# Patient Record
Sex: MALE | Race: OTHER | NOT HISPANIC OR LATINO
[De-identification: names, ages, dates, MRNs, and addresses within clinical notes are randomized per-mention and may not be internally consistent; named-entity substitution may affect disease eponyms.]

---

## 2022-08-10 PROBLEM — Z00.00 ENCOUNTER FOR PREVENTIVE HEALTH EXAMINATION: Status: ACTIVE | Noted: 2022-08-10

## 2022-08-19 ENCOUNTER — APPOINTMENT (OUTPATIENT)
Dept: NEUROPSYCHOLOGY | Facility: CLINIC | Age: 62
End: 2022-08-19

## 2022-09-02 ENCOUNTER — APPOINTMENT (OUTPATIENT)
Dept: NEUROPSYCHOLOGY | Facility: CLINIC | Age: 62
End: 2022-09-02

## 2022-09-02 DIAGNOSIS — F45.42 PAIN DISORDER WITH RELATED PSYCHOLOGICAL FACTORS: ICD-10-CM

## 2022-09-02 PROCEDURE — 90791 PSYCH DIAGNOSTIC EVALUATION: CPT

## 2022-09-02 PROCEDURE — 99072 ADDL SUPL MATRL&STAF TM PHE: CPT

## 2022-10-19 ENCOUNTER — APPOINTMENT (OUTPATIENT)
Dept: CARDIOLOGY | Facility: CLINIC | Age: 62
End: 2022-10-19

## 2022-10-19 ENCOUNTER — TRANSCRIPTION ENCOUNTER (OUTPATIENT)
Age: 62
End: 2022-10-19

## 2022-10-19 VITALS
OXYGEN SATURATION: 96 % | BODY MASS INDEX: 39.15 KG/M2 | WEIGHT: 235 LBS | SYSTOLIC BLOOD PRESSURE: 142 MMHG | HEART RATE: 81 BPM | HEIGHT: 65 IN | DIASTOLIC BLOOD PRESSURE: 82 MMHG

## 2022-10-19 DIAGNOSIS — E78.5 HYPERLIPIDEMIA, UNSPECIFIED: ICD-10-CM

## 2022-10-19 DIAGNOSIS — I71.40 ABDOMINAL AORTIC ANEURYSM, WITHOUT RUPTURE, UNSPECIFIED: ICD-10-CM

## 2022-10-19 DIAGNOSIS — Z87.891 PERSONAL HISTORY OF NICOTINE DEPENDENCE: ICD-10-CM

## 2022-10-19 DIAGNOSIS — F17.290 NICOTINE DEPENDENCE, OTHER TOBACCO PRODUCT, UNCOMPLICATED: ICD-10-CM

## 2022-10-19 DIAGNOSIS — R06.02 SHORTNESS OF BREATH: ICD-10-CM

## 2022-10-19 DIAGNOSIS — Z82.49 FAMILY HISTORY OF ISCHEMIC HEART DISEASE AND OTHER DISEASES OF THE CIRCULATORY SYSTEM: ICD-10-CM

## 2022-10-19 PROCEDURE — 99406 BEHAV CHNG SMOKING 3-10 MIN: CPT

## 2022-10-19 PROCEDURE — 99204 OFFICE O/P NEW MOD 45 MIN: CPT

## 2022-10-19 NOTE — PHYSICAL EXAM
[Well Developed] : well developed [Well Nourished] : well nourished [No Acute Distress] : no acute distress [Normal Conjunctiva] : normal conjunctiva [Normal Venous Pressure] : normal venous pressure [No Carotid Bruit] : no carotid bruit [Normal S1, S2] : normal S1, S2 [No Rub] : no rub [No Gallop] : no gallop [II] : a grade 2 [Clear Lung Fields] : clear lung fields [Good Air Entry] : good air entry [No Respiratory Distress] : no respiratory distress  [Soft] : abdomen soft [Non Tender] : non-tender [No Masses/organomegaly] : no masses/organomegaly [Normal Bowel Sounds] : normal bowel sounds [Normal Gait] : normal gait [No Edema] : no edema [No Cyanosis] : no cyanosis [No Clubbing] : no clubbing [No Varicosities] : no varicosities [No Rash] : no rash [No Skin Lesions] : no skin lesions [Moves all extremities] : moves all extremities [No Focal Deficits] : no focal deficits [Normal Speech] : normal speech [Alert and Oriented] : alert and oriented [Normal memory] : normal memory

## 2022-10-20 NOTE — HISTORY OF PRESENT ILLNESS
[FreeTextEntry1] : VAN JOY is a 62-year-old male, with a PMHx significant for abdominal aortic ectasia (measuring 3.0 cm one year ago), HLD, and smoking, who presents today for cardiac evaluation for abdominal aortic aneurysm. Denies any other complaints. Otherwise: (-) SOB, (-) chest pain. \par \par \par

## 2022-10-20 NOTE — DISCUSSION/SUMMARY
[FreeTextEntry1] : AAA: The impression is abdominal aortic aneurysm. Recommend a repeat abdominal aortic US. \par \par Smoking: Nicotine dependence. Counselling provided to the patient regarding cessation of smoking. Approximately 5 minutes spent on smoking cessation.\par \par Elevated BP: BP today measures 142/82 mmHg. Advised to keep a BP diary x 3 days. \par \par Murmur: Recommend an echocardiogram to evaluate LV function.\par \par HLD: The impression is hyperlipidemia. Currently, the condition is stable. There are no changes in medication management. Continue current medical therapy. Other planned treatment includes diet modification, exercise, and weight loss.\par \par Instructed to follow up after testing is complete. \par \par Plan was discussed with the patient.\par

## 2022-12-23 ENCOUNTER — APPOINTMENT (OUTPATIENT)
Dept: CARDIOLOGY | Facility: CLINIC | Age: 62
End: 2022-12-23

## 2022-12-23 PROCEDURE — 93306 TTE W/DOPPLER COMPLETE: CPT

## 2022-12-23 PROCEDURE — 76705 ECHO EXAM OF ABDOMEN: CPT

## 2024-07-17 ENCOUNTER — RX RENEWAL (OUTPATIENT)
Age: 64
End: 2024-07-17

## 2024-07-29 ENCOUNTER — APPOINTMENT (OUTPATIENT)
Dept: CARDIOLOGY | Facility: CLINIC | Age: 64
End: 2024-07-29
Payer: MEDICARE

## 2024-07-29 VITALS
HEART RATE: 70 BPM | OXYGEN SATURATION: 97 % | RESPIRATION RATE: 16 BRPM | BODY MASS INDEX: 35.82 KG/M2 | DIASTOLIC BLOOD PRESSURE: 62 MMHG | HEIGHT: 65 IN | SYSTOLIC BLOOD PRESSURE: 122 MMHG | WEIGHT: 215 LBS

## 2024-07-29 DIAGNOSIS — I71.40 ABDOMINAL AORTIC ANEURYSM, WITHOUT RUPTURE, UNSPECIFIED: ICD-10-CM

## 2024-07-29 DIAGNOSIS — R06.02 SHORTNESS OF BREATH: ICD-10-CM

## 2024-07-29 DIAGNOSIS — E78.5 HYPERLIPIDEMIA, UNSPECIFIED: ICD-10-CM

## 2024-07-29 PROCEDURE — 99214 OFFICE O/P EST MOD 30 MIN: CPT

## 2024-07-29 PROCEDURE — G2211 COMPLEX E/M VISIT ADD ON: CPT

## 2024-07-29 RX ORDER — UBIDECARENONE/VIT E ACET 100MG-5
100 CAPSULE ORAL
Refills: 0 | Status: ACTIVE | COMMUNITY
Start: 2024-07-29

## 2024-07-31 NOTE — REVIEW OF SYSTEMS
[Negative] : Heme/Lymph [Chest Discomfort] : no chest discomfort [Lower Ext Edema] : no extremity edema [Leg Claudication] : no intermittent leg claudication [Palpitations] : no palpitations [Syncope] : no syncope [Cough] : no cough [FreeTextEntry5] : (+) SOB on Exertion, (+) Orthopnea, (+) PND [FreeTextEntry6] : (+) SOB on Exertion

## 2024-07-31 NOTE — CARDIOLOGY SUMMARY
[de-identified] : TTE - 12/23/2022: CONCLUSIONS: 1. Normal left ventricular size, wall thickness, wall motion, and systolic function. 2. Normal right ventricular size and systolic function. 3. The left atrium is normal in size. 4. The right atrium is normal in size. 5. No pericardial effusion seen. 6. Normal mitral valve structure and function. No mitral stenosis or significant regurgitation. 7. Tricuspid valve is structurally normal with no stenosis or significant regurgitation. =======================================================

## 2024-07-31 NOTE — DISCUSSION/SUMMARY
[FreeTextEntry1] : SOB on Exertion/Orthopnea/PND: Recommend an echocardiogram to evaluate LV function and rule out CHF.  SOBOE: Due to exertional nature of symptoms, recommend a stress echocardiogram to evaluate for exercise-induced symptoms.  AAA: The impression is abdominal aortic aneurysm. Recommend a repeat abdominal aortic US to reevaluate AAA.   HLD: The impression is hyperlipidemia. Currently, the condition is stable. There are no changes in medication management. Continue current medical therapy. Other planned treatment includes diet modification, exercise, and weight loss.  Instructed to follow up after testing is complete.  Plan was discussed with the patient.

## 2024-07-31 NOTE — CARDIOLOGY SUMMARY
[de-identified] : TTE - 12/23/2022: CONCLUSIONS: 1. Normal left ventricular size, wall thickness, wall motion, and systolic function. 2. Normal right ventricular size and systolic function. 3. The left atrium is normal in size. 4. The right atrium is normal in size. 5. No pericardial effusion seen. 6. Normal mitral valve structure and function. No mitral stenosis or significant regurgitation. 7. Tricuspid valve is structurally normal with no stenosis or significant regurgitation. =======================================================

## 2024-07-31 NOTE — HISTORY OF PRESENT ILLNESS
[FreeTextEntry1] : VAN JOY is a 64-year-old male, with a PMHx significant for borderline abdominal aortic aneurysm (2.5 cm) and HLD, who presents today for a follow-up visit. Last seen in 2022. Recently had a spinal stimulator placed and is now pending a right hip surgery. Endorses SOB with exertion, which is relieved with rest. Otherwise: (-) chest pain, (-) cough, (-) hemoptysis, (-) recent travel, (-) prolonged immobility.  Social History: (+) Former smoker.

## 2024-08-27 ENCOUNTER — APPOINTMENT (OUTPATIENT)
Dept: CARDIOLOGY | Facility: CLINIC | Age: 64
End: 2024-08-27
Payer: MEDICARE

## 2024-08-27 DIAGNOSIS — E78.5 HYPERLIPIDEMIA, UNSPECIFIED: ICD-10-CM

## 2024-08-27 DIAGNOSIS — I71.40 ABDOMINAL AORTIC ANEURYSM, WITHOUT RUPTURE, UNSPECIFIED: ICD-10-CM

## 2024-08-27 DIAGNOSIS — R06.02 SHORTNESS OF BREATH: ICD-10-CM

## 2024-08-27 LAB
ALBUMIN SERPL ELPH-MCNC: 4.5 G/DL
ALP BLD-CCNC: 108 U/L
ALT SERPL-CCNC: 13 U/L
ANION GAP SERPL CALC-SCNC: 12 MMOL/L
APTT BLD: 32 SEC
AST SERPL-CCNC: 15 U/L
BILIRUB SERPL-MCNC: 0.5 MG/DL
BUN SERPL-MCNC: 18 MG/DL
CALCIUM SERPL-MCNC: 9.7 MG/DL
CHLORIDE SERPL-SCNC: 104 MMOL/L
CO2 SERPL-SCNC: 27 MMOL/L
CREAT SERPL-MCNC: 1.2 MG/DL
EGFR: 67 ML/MIN/1.73M2
GLUCOSE SERPL-MCNC: 103 MG/DL
HCT VFR BLD CALC: 48.5 %
HGB BLD-MCNC: 15.9 G/DL
INR PPP: 1.06 RATIO
MCHC RBC-ENTMCNC: 28.9 PG
MCHC RBC-ENTMCNC: 32.8 G/DL
MCV RBC AUTO: 88 FL
PLATELET # BLD AUTO: 295 K/UL
PMV BLD AUTO: 0 /100 WBCS
PMV BLD: 10.2 FL
POTASSIUM SERPL-SCNC: 5.2 MMOL/L
PROT SERPL-MCNC: 7.5 G/DL
PT BLD: 12.1 SEC
RBC # BLD: 5.51 M/UL
RBC # FLD: 13.5 %
SODIUM SERPL-SCNC: 143 MMOL/L
WBC # FLD AUTO: 8.86 K/UL

## 2024-08-27 PROCEDURE — 76706 US ABDL AORTA SCREEN AAA: CPT

## 2024-08-27 PROCEDURE — 93306 TTE W/DOPPLER COMPLETE: CPT

## 2024-08-27 PROCEDURE — 99214 OFFICE O/P EST MOD 30 MIN: CPT | Mod: 25

## 2024-08-27 PROCEDURE — G2211 COMPLEX E/M VISIT ADD ON: CPT

## 2024-08-27 PROCEDURE — 93015 CV STRESS TEST SUPVJ I&R: CPT

## 2024-08-27 PROCEDURE — 99214 OFFICE O/P EST MOD 30 MIN: CPT

## 2024-08-27 NOTE — DISCUSSION/SUMMARY
[FreeTextEntry1] : SOBOE: Stress EKG performed today revealed significant ST depressions in II, III, and aVF. Echocardiogram post EKG images revealed anterior anterolateral wall abnormalities. Echo revealed normal LV function. Due to exertional nature of symptoms, recommend a cardiac catheterization with possible PCI. Discussed risks, benefits, and alternatives in detail with the patient. Will schedule cardiac cath in 2 weeks.   AAA: The impression is abdominal aortic aneurysm. Abdominal Aortic US revealed 2.4 cm distal ascending aorta.   HLD: The impression is hyperlipidemia. Currently, the condition is stable. There are no changes in medication management. Continue current medical therapy. Other planned treatment includes diet modification, exercise, and weight loss.  Labs provided to the patient.   Results and plan discussed with the patient, who expressed understanding.

## 2024-08-27 NOTE — CARDIOLOGY SUMMARY
[de-identified] : TTE - 12/23/2022: CONCLUSIONS: 1. Normal left ventricular size, wall thickness, wall motion, and systolic function. 2. Normal right ventricular size and systolic function. 3. The left atrium is normal in size. 4. The right atrium is normal in size. 5. No pericardial effusion seen. 6. Normal mitral valve structure and function. No mitral stenosis or significant regurgitation. 7. Tricuspid valve is structurally normal with no stenosis or significant regurgitation. =======================================================

## 2024-08-27 NOTE — HISTORY OF PRESENT ILLNESS
[FreeTextEntry1] : VAN JOY is a 64-year-old male, with a PMHx significant for borderline abdominal aortic aneurysm (2.5 cm) and HLD, who presents today for a follow-up visit for stress EKG.   Social History: (+) Former smoker.    PRIOR HPI FROM 7/29/2024:  Patient presents for a follow-up visit. Last seen in 2022. Recently had a spinal stimulator placed and is now pending a right hip surgery. Endorses SOB with exertion, which is relieved with rest. Otherwise: (-) chest pain, (-) cough, (-) hemoptysis, (-) recent travel, (-) prolonged immobility.

## 2024-09-07 VITALS — DIASTOLIC BLOOD PRESSURE: 81 MMHG | HEART RATE: 66 BPM | OXYGEN SATURATION: 97 % | SYSTOLIC BLOOD PRESSURE: 126 MMHG

## 2024-09-07 NOTE — H&P CARDIOLOGY - HISTORY OF PRESENT ILLNESS
Patient is a 64y Male PMH: HLD, Aortic ectasia, Spinal stimulator. Pt c/o BANKS .........  S/p Exercise Stress showing 2mm horizontal ST depressions during stress in III, II, AVF.  Presents today for [facility] with possible intervention.      stress echo 8/27/24 :   Stress Test Report    Indication: SOB with exertion.    Duration: 7:40 minutes  Peak heart rate: 152    Maximal Stage: 2  Symptoms: dyspnea  ECG Changes: > 1.5 mm horizontal ST depression  Arrhythmias: yes PVCs.  Patient achieved 98 percent of the maximum predicted heart rate  1. METs 9.8.  2. 2mm horizontal ST depressions during stress in III, II, AVF. Resolved by 1:47 into recovery.      Pre cath note:  indication:  [ ] STEMI                [ ] NSTEMI                 [ ] Acute coronary syndrome                   [ ]Unstable Angina   [ ] high risk  [ ] intermediate risk  [ ] low risk                   [x ] Stable Angina     non-invasive testing:                          Date:        8/27/24             result: [ ] high risk  [ ] intermediate risk  [ ] low risk    Anti- Anginal medications:                    [ ] not used                       [ ] used                   [ ] not used but strong indication not to use    Ejection Fraction                   [ ] <29            [ ] 30-39%   [ ] 40-49%     [ ]>50%    CHF                   [ ] active (within last 14 days on meds   [ ] Chronic (on meds but no exacerbation)    COPD                   [ ] mild (on chronic bronchodilators)  [ ] moderate (on chronic steroid therapy)      [ ] severe (indication for home O2 or PACO2 >50)    Other risk factors:                     [ ] Previous MI                     [ ] CVA/ stroke                    [ ] carotid stent/ CEA                    [ ] PVD/PAD- (arterial aneurysm, non-palpable pulses, tortuous vessel with inability to insert catheter, infra-renal dissection, renal or subclavian artery stenosis)                    [ ] diabetic                    [ ] previous CABG                    [ ] Renal Failure       RIGHT RADIAL ARTERY EVALUATION:  JUAN CARLOS TEST: [] Negative          [] Positive  BARBEAU TEST: [] Class A           [] Class B           [] Class C            [] Class D    Bleeding Risk:  0.8%    IV Hydration:      EF:  EKG:     	         Patient is a 64y Male PMH: HLD, Aortic ectasia, Spinal stimulator, + family h/o CABG (mother). He initially presented for cardiac clearance for his upcoming hip surgery. Pt c/o BANKS.  He had an Exercise Stress showing 2mm horizontal ST depressions during stress in III, II, AVF.  Presents today for [facility] with possible intervention.      stress echo 24 :   Stress Test Report    Indication: SOB with exertion.    Duration: 7:40 minutes  Peak heart rate: 152    Maximal Stage: 2  Symptoms: dyspnea  ECG Changes: > 1.5 mm horizontal ST depression  Arrhythmias: yes PVCs.  Patient achieved 98 percent of the maximum predicted heart rate  1. METs 9.8.  2. 2mm horizontal ST depressions during stress in III, II, AVF. Resolved by 1:47 into recovery.      Pre cath note:  indication:  [ ] STEMI                [ ] NSTEMI                 [ ] Acute coronary syndrome                   [ ]Unstable Angina   [ ] high risk  [ ] intermediate risk  [ ] low risk                   [x ] Stable Angina     non-invasive testing:                          Date:        24             result: [ ] high risk  [ ] intermediate risk  [ ] low risk    Anti- Anginal medications:                    [ ] not used                       [x ] used       CCB given precath, 2nd not given d/t marginal /81            [ ] not used but strong indication not to use    Ejection Fraction                   [ ] <29            [ ] 30-39%   [ ] 40-49%     [ ]>50%    CHF                   [ ] active (within last 14 days on meds   [ ] Chronic (on meds but no exacerbation)    COPD                   [ ] mild (on chronic bronchodilators)  [ ] moderate (on chronic steroid therapy)      [ ] severe (indication for home O2 or PACO2 >50)    Other risk factors:                     [ ] Previous MI                     [ ] CVA/ stroke                    [ ] carotid stent/ CEA                    [ ] PVD/PAD- (arterial aneurysm, non-palpable pulses, tortuous vessel with inability to insert catheter, infra-renal dissection, renal or subclavian artery stenosis)                    [ ] diabetic                    [ ] previous CABG                    [ ] Renal Failure       RIGHT RADIAL ARTERY EVALUATION:  JUAN CARLOS TEST: [] positive    Bleeding Risk:  0.8%    IV Hydration:      EF:  EK/27 59bpm  Prehydration:  bolus x 1 hr then 75 ml/hr x 2

## 2024-09-12 ENCOUNTER — TRANSCRIPTION ENCOUNTER (OUTPATIENT)
Age: 64
End: 2024-09-12

## 2024-09-12 ENCOUNTER — OUTPATIENT (OUTPATIENT)
Dept: OUTPATIENT SERVICES | Facility: HOSPITAL | Age: 64
LOS: 1 days | Discharge: HOME | End: 2024-09-12
Payer: MEDICARE

## 2024-09-12 VITALS
HEART RATE: 59 BPM | OXYGEN SATURATION: 98 % | DIASTOLIC BLOOD PRESSURE: 66 MMHG | RESPIRATION RATE: 15 BRPM | SYSTOLIC BLOOD PRESSURE: 128 MMHG

## 2024-09-12 DIAGNOSIS — I20.0 UNSTABLE ANGINA: ICD-10-CM

## 2024-09-12 LAB
ANION GAP SERPL CALC-SCNC: 10 MMOL/L — SIGNIFICANT CHANGE UP (ref 7–14)
BUN SERPL-MCNC: 16 MG/DL — SIGNIFICANT CHANGE UP (ref 10–20)
CALCIUM SERPL-MCNC: 9.4 MG/DL — SIGNIFICANT CHANGE UP (ref 8.4–10.5)
CHLORIDE SERPL-SCNC: 105 MMOL/L — SIGNIFICANT CHANGE UP (ref 98–110)
CO2 SERPL-SCNC: 27 MMOL/L — SIGNIFICANT CHANGE UP (ref 17–32)
CREAT SERPL-MCNC: 1 MG/DL — SIGNIFICANT CHANGE UP (ref 0.7–1.5)
EGFR: 84 ML/MIN/1.73M2 — SIGNIFICANT CHANGE UP
GLUCOSE SERPL-MCNC: 109 MG/DL — HIGH (ref 70–99)
HCT VFR BLD CALC: 46.9 % — SIGNIFICANT CHANGE UP (ref 42–52)
HGB BLD-MCNC: 15.2 G/DL — SIGNIFICANT CHANGE UP (ref 14–18)
MCHC RBC-ENTMCNC: 28.5 PG — SIGNIFICANT CHANGE UP (ref 27–31)
MCHC RBC-ENTMCNC: 32.4 G/DL — SIGNIFICANT CHANGE UP (ref 32–37)
MCV RBC AUTO: 87.8 FL — SIGNIFICANT CHANGE UP (ref 80–94)
NRBC # BLD: 0 /100 WBCS — SIGNIFICANT CHANGE UP (ref 0–0)
PLATELET # BLD AUTO: 274 K/UL — SIGNIFICANT CHANGE UP (ref 130–400)
PMV BLD: 9.5 FL — SIGNIFICANT CHANGE UP (ref 7.4–10.4)
POTASSIUM SERPL-MCNC: 5 MMOL/L — SIGNIFICANT CHANGE UP (ref 3.5–5)
POTASSIUM SERPL-SCNC: 5 MMOL/L — SIGNIFICANT CHANGE UP (ref 3.5–5)
RBC # BLD: 5.34 M/UL — SIGNIFICANT CHANGE UP (ref 4.7–6.1)
RBC # FLD: 13.2 % — SIGNIFICANT CHANGE UP (ref 11.5–14.5)
SODIUM SERPL-SCNC: 142 MMOL/L — SIGNIFICANT CHANGE UP (ref 135–146)
WBC # BLD: 10.29 K/UL — SIGNIFICANT CHANGE UP (ref 4.8–10.8)
WBC # FLD AUTO: 10.29 K/UL — SIGNIFICANT CHANGE UP (ref 4.8–10.8)

## 2024-09-12 PROCEDURE — C1894: CPT

## 2024-09-12 PROCEDURE — 85027 COMPLETE CBC AUTOMATED: CPT

## 2024-09-12 PROCEDURE — C1887: CPT

## 2024-09-12 PROCEDURE — 80048 BASIC METABOLIC PNL TOTAL CA: CPT

## 2024-09-12 PROCEDURE — 36415 COLL VENOUS BLD VENIPUNCTURE: CPT

## 2024-09-12 PROCEDURE — C1769: CPT

## 2024-09-12 PROCEDURE — 93458 L HRT ARTERY/VENTRICLE ANGIO: CPT

## 2024-09-12 RX ORDER — ASPIRIN 81 MG
81 TABLET, DELAYED RELEASE (ENTERIC COATED) ORAL ONCE
Refills: 0 | Status: COMPLETED | OUTPATIENT
Start: 2024-09-12 | End: 2024-09-12

## 2024-09-12 RX ORDER — OXYCODONE HYDROCHLORIDE 5 MG/1
1 TABLET ORAL
Refills: 0 | DISCHARGE

## 2024-09-12 RX ORDER — SODIUM CHLORIDE 9 MG/ML
1000 INJECTION INTRAMUSCULAR; INTRAVENOUS; SUBCUTANEOUS
Refills: 0 | Status: DISCONTINUED | OUTPATIENT
Start: 2024-09-12 | End: 2024-09-12

## 2024-09-12 RX ORDER — AMLODIPINE BESYLATE 10 MG/1
2.5 TABLET ORAL ONCE
Refills: 0 | Status: COMPLETED | OUTPATIENT
Start: 2024-09-12 | End: 2024-09-12

## 2024-09-12 RX ORDER — GLUCOSAMINE/MSM/CHONDROITIN A 500-83-400
1 TABLET ORAL
Refills: 0 | DISCHARGE

## 2024-09-12 RX ORDER — CHLORHEXIDINE GLUCONATE 40 MG/ML
1 SOLUTION TOPICAL ONCE
Refills: 0 | Status: DISCONTINUED | OUTPATIENT
Start: 2024-09-12 | End: 2024-09-12

## 2024-09-12 RX ORDER — SODIUM CHLORIDE 9 MG/ML
250 INJECTION INTRAMUSCULAR; INTRAVENOUS; SUBCUTANEOUS ONCE
Refills: 0 | Status: COMPLETED | OUTPATIENT
Start: 2024-09-12 | End: 2024-09-12

## 2024-09-12 RX ORDER — ASPIRIN 81 MG
1 TABLET, DELAYED RELEASE (ENTERIC COATED) ORAL
Refills: 0 | DISCHARGE

## 2024-09-12 RX ADMIN — SODIUM CHLORIDE 100 MILLILITER(S): 9 INJECTION INTRAMUSCULAR; INTRAVENOUS; SUBCUTANEOUS at 08:57

## 2024-09-12 RX ADMIN — SODIUM CHLORIDE 250 MILLILITER(S): 9 INJECTION INTRAMUSCULAR; INTRAVENOUS; SUBCUTANEOUS at 07:08

## 2024-09-12 RX ADMIN — AMLODIPINE BESYLATE 2.5 MILLIGRAM(S): 10 TABLET ORAL at 07:01

## 2024-09-12 RX ADMIN — Medication 81 MILLIGRAM(S): at 07:01

## 2024-09-12 NOTE — CHART NOTE - NSCHARTNOTEFT_GEN_A_CORE
PROCEDURE: [facility]    PRIMARY PHYSICIAN:  Dr. Medina  FELLOW: Amy      PROCEDURE DESCRIPTION:   Anesthesia: Local + Sedation     Access: R radial A  Closure: D stat    Intervention: n/a  Implants: n/a    IV Contrast: 65 mL      ESTIMATED BLOOD LOSS: <10cc    SPECIMENS REMOVED: None    FINDINGS:   DOMINANCE: Right    LM: Mild disease  LAD: Moderate disease diffusely  D1: Moderate disease  CX: Moderate disease diffusely  OM1; Small, moderate ostial disease  OM2: Moderate disease  RCA: Moderate disease in proximal and mid segments, 50% stenosis in distal segment  RPDA: Moderate disease      LVEDP:  7 mmHg   EF:   65 %     POST-OP DIAGNOSIS:  Moderate Coronary Artery Disease       PLAN OF CARE:   [x] Post-procedure LVEDP-guided IV fluids: 150 cc/hr x 2 hrs  [x] Medications:   - ASA, statin  [x] Smoking cessation    DISPOSITION:  [x] D/C Home Today

## 2024-09-12 NOTE — ASU PATIENT PROFILE, ADULT - FALL HARM RISK - UNIVERSAL INTERVENTIONS
Bed in lowest position, wheels locked, appropriate side rails in place/Call bell, personal items and telephone in reach/Instruct patient to call for assistance before getting out of bed or chair/Non-slip footwear when patient is out of bed/Quinn to call system/Physically safe environment - no spills, clutter or unnecessary equipment/Purposeful Proactive Rounding/Room/bathroom lighting operational, light cord in reach

## 2024-09-12 NOTE — ASU DISCHARGE PLAN (ADULT/PEDIATRIC) - ASU DC SPECIAL INSTRUCTIONSFT
Discharge Instructions as follows:  - Continue medical regimen as prescribed.  - Continue baby ASA, Statin   - Instructed to call 911 if chest pain, shortness of breath or bleeding from access site.  - No heavy lifting > 10lbs x 1 week.  - No driving x 24 hours.  - No baths, swimming pools x 1 week, may shower  - Low sodium low fat low cholesterol diet  - Follow-up with Cardiologist in 1-2 weeks after discharge  - Soreness or tenderness at the site is possible, it will diminish over time. You may take Tylenol every 4-6 hours as needed. Nothing stronger is needed. NO Motrin/Ibuprofen  - Any questions call cardiac cath lab [phone]

## 2024-09-12 NOTE — ASU DISCHARGE PLAN (ADULT/PEDIATRIC) - CARE PROVIDER_API CALL
Cal Medina  Interventional Cardiology  [address] [place] [address]  Phone: [phone]  Fax: [phone]  Follow Up Time: 2 weeks

## 2024-09-16 PROBLEM — I77.819 AORTIC ECTASIA, UNSPECIFIED SITE: Chronic | Status: ACTIVE | Noted: 2024-09-07

## 2024-09-16 PROBLEM — E78.5 HYPERLIPIDEMIA, UNSPECIFIED: Chronic | Status: ACTIVE | Noted: 2024-09-07

## 2024-09-17 DIAGNOSIS — R94.39 ABNORMAL RESULT OF OTHER CARDIOVASCULAR FUNCTION STUDY: ICD-10-CM

## 2024-09-17 DIAGNOSIS — E78.5 HYPERLIPIDEMIA, UNSPECIFIED: ICD-10-CM

## 2024-09-17 DIAGNOSIS — I25.10 ATHEROSCLEROTIC HEART DISEASE OF NATIVE CORONARY ARTERY WITHOUT ANGINA PECTORIS: ICD-10-CM

## 2024-09-25 ENCOUNTER — APPOINTMENT (OUTPATIENT)
Dept: CARDIOLOGY | Facility: CLINIC | Age: 64
End: 2024-09-25
Payer: MEDICARE

## 2024-09-25 VITALS
HEIGHT: 65 IN | DIASTOLIC BLOOD PRESSURE: 80 MMHG | RESPIRATION RATE: 15 BRPM | HEART RATE: 66 BPM | BODY MASS INDEX: 35.16 KG/M2 | WEIGHT: 211 LBS | OXYGEN SATURATION: 96 % | SYSTOLIC BLOOD PRESSURE: 132 MMHG

## 2024-09-25 DIAGNOSIS — E78.5 HYPERLIPIDEMIA, UNSPECIFIED: ICD-10-CM

## 2024-09-25 DIAGNOSIS — I25.10 ATHEROSCLEROTIC HEART DISEASE OF NATIVE CORONARY ARTERY W/OUT ANGINA PECTORIS: ICD-10-CM

## 2024-09-25 DIAGNOSIS — Z01.818 ENCOUNTER FOR OTHER PREPROCEDURAL EXAMINATION: ICD-10-CM

## 2024-09-25 DIAGNOSIS — I71.40 ABDOMINAL AORTIC ANEURYSM, WITHOUT RUPTURE, UNSPECIFIED: ICD-10-CM

## 2024-09-25 PROCEDURE — G2211 COMPLEX E/M VISIT ADD ON: CPT

## 2024-09-25 PROCEDURE — 99214 OFFICE O/P EST MOD 30 MIN: CPT

## 2024-09-25 PROCEDURE — 93000 ELECTROCARDIOGRAM COMPLETE: CPT

## 2024-09-30 NOTE — DISCUSSION/SUMMARY
[EKG obtained to assist in diagnosis and management of assessed problem(s)] : EKG obtained to assist in diagnosis and management of assessed problem(s) [FreeTextEntry1] : EKG performed today was unremarkable.  Preoperative Cardiac Evaluation: Patient is medically optimized. The patient is an intermediate-risk patient for an intermediate-risk procedure and can proceed with surgery as planned without further cardiac intervention.  Mild Nonobstructive CAD: The impression is mild nonobstructive coronary artery disease. Recent cardiac cath revealed nonobstructive CAD. Currently, the condition is stable. There are no changes in medication management. Continue current medical therapy. Other planned treatment includes dietary modification, an exercise regimen, and weight reduction.  AAA: The impression is abdominal aortic aneurysm. Prior Abdominal Aortic US revealed 2.4 cm distal ascending aorta.   HLD: The impression is hyperlipidemia. Repeat lipid profile in 6 months. There are no changes in medication management. Continue current medical therapy. Other planned treatment includes diet modification, exercise, and weight loss.  Lab requisition provided to check A1CG, CMP, Lipid Profile, TSH, and UA.  Instructed to follow up in 6 months.  Plan was discussed with the patient.

## 2024-09-30 NOTE — HISTORY OF PRESENT ILLNESS
[FreeTextEntry1] : VAN JOY is a 64-year-old male, with a PMHx significant for borderline abdominal aortic aneurysm (2.5 cm) and HLD, who presents today for a follow-up visit s/p cardiac catheterization. Cardiac cath revealed mild nonobstructive CAD. Medical therapy was recommended. Of note, patient is planning for hip surgery in the near future. Otherwise: (-) chest pain, (-) SOB.  Social History: (+) Former smoker.

## 2024-09-30 NOTE — CARDIOLOGY SUMMARY
[de-identified] : 09/25/2024: NSR, normal axis, normal intervals, (-) ST-T wave changes. ======================================================= [de-identified] : Treadmill Stress - 08/27/2024: Conclusions: 1. The ECG is positive for ischemia. Recommend cardiac catheterization. 2. The patient underwent stress testing using the standard Leonidas protocol. _ The test was stopped due to shortness of breath and leg fatigue. _ The peak heart rate was 152 bpm; 98 % of predicted maximal heart rate for this patient. _ The patient achieved 9.8 METS which is consistent with good exercise capacity. 3. Baseline electrocardiogram: normal sinus rhythm at a rate of 59 bpm with occasional premature ventricular contractions and no arrhythmias. 4. Electrocardiogram ischemic changes: 2mm horizontal ST depressions during stress in III, II, AVF. Resolved by 1:47 into recovery. 5. Normal heart rate response. 6. Normal blood pressure response. 7. Arrhythmias: Occasional VPD's occurred during stress. ======================================================= [de-identified] : TTE - 08/27/2024: CONCLUSIONS: 1. Left ventricular cavity is normal in size. Left ventricular systolic function is normal with an ejection fraction of 61 % by York's method of disks with an ejection fraction visually estimated at 60 to 65 %. 2. The left ventricular diastolic function is indeterminate. 3. The right ventricle is not well visualized. 4. Right atrium was not well visualized. 5. Tricuspid valve was not well visualized. 6. Structurally normal pulmonic valve with normal leaflet excursion. 7. No significant valvular disease. 8. Structurally normal mitral valve with normal leaflet excursion. 9. The aortic root and ascending aorta appear normal in size. 10. No pericardial effusion seen. 11. The interatrial septum appears intact. ======================================================= TTE - 12/23/2022: CONCLUSIONS: 1. Normal left ventricular size, wall thickness, wall motion, and systolic function. 2. Normal right ventricular size and systolic function. 3. The left atrium is normal in size. 4. The right atrium is normal in size. 5. No pericardial effusion seen. 6. Normal mitral valve structure and function. No mitral stenosis or significant regurgitation. 7. Tricuspid valve is structurally normal with no stenosis or significant regurgitation. =======================================================

## 2024-09-30 NOTE — CARDIOLOGY SUMMARY
[de-identified] : 09/25/2024: NSR, normal axis, normal intervals, (-) ST-T wave changes. ======================================================= [de-identified] : Treadmill Stress - 08/27/2024: Conclusions: 1. The ECG is positive for ischemia. Recommend cardiac catheterization. 2. The patient underwent stress testing using the standard Leonidas protocol. _ The test was stopped due to shortness of breath and leg fatigue. _ The peak heart rate was 152 bpm; 98 % of predicted maximal heart rate for this patient. _ The patient achieved 9.8 METS which is consistent with good exercise capacity. 3. Baseline electrocardiogram: normal sinus rhythm at a rate of 59 bpm with occasional premature ventricular contractions and no arrhythmias. 4. Electrocardiogram ischemic changes: 2mm horizontal ST depressions during stress in III, II, AVF. Resolved by 1:47 into recovery. 5. Normal heart rate response. 6. Normal blood pressure response. 7. Arrhythmias: Occasional VPD's occurred during stress. ======================================================= [de-identified] : TTE - 08/27/2024: CONCLUSIONS: 1. Left ventricular cavity is normal in size. Left ventricular systolic function is normal with an ejection fraction of 61 % by York's method of disks with an ejection fraction visually estimated at 60 to 65 %. 2. The left ventricular diastolic function is indeterminate. 3. The right ventricle is not well visualized. 4. Right atrium was not well visualized. 5. Tricuspid valve was not well visualized. 6. Structurally normal pulmonic valve with normal leaflet excursion. 7. No significant valvular disease. 8. Structurally normal mitral valve with normal leaflet excursion. 9. The aortic root and ascending aorta appear normal in size. 10. No pericardial effusion seen. 11. The interatrial septum appears intact. ======================================================= TTE - 12/23/2022: CONCLUSIONS: 1. Normal left ventricular size, wall thickness, wall motion, and systolic function. 2. Normal right ventricular size and systolic function. 3. The left atrium is normal in size. 4. The right atrium is normal in size. 5. No pericardial effusion seen. 6. Normal mitral valve structure and function. No mitral stenosis or significant regurgitation. 7. Tricuspid valve is structurally normal with no stenosis or significant regurgitation. =======================================================

## 2025-03-21 ENCOUNTER — NON-APPOINTMENT (OUTPATIENT)
Age: 65
End: 2025-03-21

## 2025-03-24 ENCOUNTER — NON-APPOINTMENT (OUTPATIENT)
Age: 65
End: 2025-03-24

## 2025-03-26 ENCOUNTER — APPOINTMENT (OUTPATIENT)
Dept: CARDIOLOGY | Facility: CLINIC | Age: 65
End: 2025-03-26
Payer: MEDICARE

## 2025-03-26 ENCOUNTER — NON-APPOINTMENT (OUTPATIENT)
Age: 65
End: 2025-03-26

## 2025-03-26 VITALS
WEIGHT: 210 LBS | HEIGHT: 65 IN | OXYGEN SATURATION: 97 % | BODY MASS INDEX: 34.99 KG/M2 | DIASTOLIC BLOOD PRESSURE: 62 MMHG | SYSTOLIC BLOOD PRESSURE: 130 MMHG | HEART RATE: 66 BPM

## 2025-03-26 VITALS
SYSTOLIC BLOOD PRESSURE: 130 MMHG | BODY MASS INDEX: 34.99 KG/M2 | OXYGEN SATURATION: 96 % | WEIGHT: 210 LBS | HEIGHT: 65 IN | DIASTOLIC BLOOD PRESSURE: 62 MMHG | RESPIRATION RATE: 33 BRPM

## 2025-03-26 DIAGNOSIS — I49.8 OTHER SPECIFIED CARDIAC ARRHYTHMIAS: ICD-10-CM

## 2025-03-26 DIAGNOSIS — I71.40 ABDOMINAL AORTIC ANEURYSM, WITHOUT RUPTURE, UNSPECIFIED: ICD-10-CM

## 2025-03-26 DIAGNOSIS — E78.5 HYPERLIPIDEMIA, UNSPECIFIED: ICD-10-CM

## 2025-03-26 DIAGNOSIS — I25.10 ATHEROSCLEROTIC HEART DISEASE OF NATIVE CORONARY ARTERY W/OUT ANGINA PECTORIS: ICD-10-CM

## 2025-03-26 DIAGNOSIS — R73.03 PREDIABETES.: ICD-10-CM

## 2025-03-26 PROCEDURE — G2211 COMPLEX E/M VISIT ADD ON: CPT

## 2025-03-26 PROCEDURE — 93000 ELECTROCARDIOGRAM COMPLETE: CPT

## 2025-03-26 PROCEDURE — 99214 OFFICE O/P EST MOD 30 MIN: CPT

## 2025-03-28 ENCOUNTER — RX RENEWAL (OUTPATIENT)
Age: 65
End: 2025-03-28

## 2025-04-10 DIAGNOSIS — I49.3 VENTRICULAR PREMATURE DEPOLARIZATION: ICD-10-CM

## 2025-04-24 ENCOUNTER — RESULT REVIEW (OUTPATIENT)
Age: 65
End: 2025-04-24

## 2025-04-24 ENCOUNTER — OUTPATIENT (OUTPATIENT)
Dept: OUTPATIENT SERVICES | Facility: HOSPITAL | Age: 65
LOS: 1 days | End: 2025-04-24
Payer: MEDICARE

## 2025-04-24 DIAGNOSIS — I49.3 VENTRICULAR PREMATURE DEPOLARIZATION: ICD-10-CM

## 2025-04-24 PROCEDURE — 75561 CARDIAC MRI FOR MORPH W/DYE: CPT | Mod: 26

## 2025-04-24 PROCEDURE — A9579: CPT

## 2025-04-24 PROCEDURE — 75561 CARDIAC MRI FOR MORPH W/DYE: CPT

## 2025-04-25 DIAGNOSIS — I49.3 VENTRICULAR PREMATURE DEPOLARIZATION: ICD-10-CM

## 2025-05-07 ENCOUNTER — NON-APPOINTMENT (OUTPATIENT)
Age: 65
End: 2025-05-07

## 2025-07-29 DIAGNOSIS — I73.9 PERIPHERAL VASCULAR DISEASE, UNSPECIFIED: ICD-10-CM

## 2025-08-04 ENCOUNTER — APPOINTMENT (OUTPATIENT)
Dept: CARDIOLOGY | Facility: CLINIC | Age: 65
End: 2025-08-04

## 2025-08-14 ENCOUNTER — APPOINTMENT (OUTPATIENT)
Dept: CARDIOLOGY | Facility: CLINIC | Age: 65
End: 2025-08-14

## 2025-08-14 DIAGNOSIS — I25.10 ATHEROSCLEROTIC HEART DISEASE OF NATIVE CORONARY ARTERY W/OUT ANGINA PECTORIS: ICD-10-CM

## 2025-08-14 DIAGNOSIS — R73.03 PREDIABETES.: ICD-10-CM

## 2025-08-14 DIAGNOSIS — E78.5 HYPERLIPIDEMIA, UNSPECIFIED: ICD-10-CM

## 2025-08-14 DIAGNOSIS — I71.40 ABDOMINAL AORTIC ANEURYSM, WITHOUT RUPTURE, UNSPECIFIED: ICD-10-CM

## 2025-08-14 PROCEDURE — 99214 OFFICE O/P EST MOD 30 MIN: CPT

## 2025-08-14 PROCEDURE — G2211 COMPLEX E/M VISIT ADD ON: CPT

## 2025-08-14 PROCEDURE — 76706 US ABDL AORTA SCREEN AAA: CPT
